# Patient Record
Sex: FEMALE | Employment: FULL TIME | URBAN - METROPOLITAN AREA
[De-identification: names, ages, dates, MRNs, and addresses within clinical notes are randomized per-mention and may not be internally consistent; named-entity substitution may affect disease eponyms.]

---

## 2019-04-01 ENCOUNTER — APPOINTMENT (OUTPATIENT)
Dept: GENERAL RADIOLOGY | Facility: CLINIC | Age: 29
End: 2019-04-01
Attending: EMERGENCY MEDICINE

## 2019-04-01 ENCOUNTER — HOSPITAL ENCOUNTER (EMERGENCY)
Facility: CLINIC | Age: 29
Discharge: HOME | End: 2019-04-01
Attending: EMERGENCY MEDICINE | Admitting: EMERGENCY MEDICINE

## 2019-04-01 VITALS
TEMPERATURE: 101.6 F | HEART RATE: 117 BPM | OXYGEN SATURATION: 95 % | BODY MASS INDEX: 43 KG/M2 | DIASTOLIC BLOOD PRESSURE: 58 MMHG | SYSTOLIC BLOOD PRESSURE: 128 MMHG | WEIGHT: 274 LBS | HEIGHT: 67 IN

## 2019-04-01 DIAGNOSIS — J11.1 INFLUENZA: ICD-10-CM

## 2019-04-01 LAB
ALBUMIN SERPL-MCNC: 3.5 G/DL (ref 3.4–5)
ALP SERPL-CCNC: 82 U/L (ref 40–150)
ALT SERPL W P-5'-P-CCNC: 22 U/L (ref 0–50)
ANION GAP SERPL CALCULATED.3IONS-SCNC: 8 MMOL/L (ref 3–14)
AST SERPL W P-5'-P-CCNC: 14 U/L (ref 0–45)
BASOPHILS # BLD AUTO: 0 10E9/L (ref 0–0.2)
BASOPHILS NFR BLD AUTO: 0.2 %
BILIRUB SERPL-MCNC: 0.3 MG/DL (ref 0.2–1.3)
BUN SERPL-MCNC: 6 MG/DL (ref 7–30)
CALCIUM SERPL-MCNC: 8.4 MG/DL (ref 8.5–10.1)
CHLORIDE SERPL-SCNC: 108 MMOL/L (ref 94–109)
CO2 SERPL-SCNC: 23 MMOL/L (ref 20–32)
CREAT SERPL-MCNC: 0.68 MG/DL (ref 0.52–1.04)
DIFFERENTIAL METHOD BLD: NORMAL
EOSINOPHIL # BLD AUTO: 0.1 10E9/L (ref 0–0.7)
EOSINOPHIL NFR BLD AUTO: 0.6 %
ERYTHROCYTE [DISTWIDTH] IN BLOOD BY AUTOMATED COUNT: 13.6 % (ref 10–15)
FLUAV+FLUBV AG SPEC QL: NEGATIVE
FLUAV+FLUBV AG SPEC QL: POSITIVE
GFR SERPL CREATININE-BSD FRML MDRD: >90 ML/MIN/{1.73_M2}
GLUCOSE SERPL-MCNC: 108 MG/DL (ref 70–99)
HCT VFR BLD AUTO: 37.9 % (ref 35–47)
HGB BLD-MCNC: 13 G/DL (ref 11.7–15.7)
IMM GRANULOCYTES # BLD: 0 10E9/L (ref 0–0.4)
IMM GRANULOCYTES NFR BLD: 0.5 %
LACTATE BLD-SCNC: 1.4 MMOL/L (ref 0.7–2)
LYMPHOCYTES # BLD AUTO: 1 10E9/L (ref 0.8–5.3)
LYMPHOCYTES NFR BLD AUTO: 11.6 %
MCH RBC QN AUTO: 30.7 PG (ref 26.5–33)
MCHC RBC AUTO-ENTMCNC: 34.3 G/DL (ref 31.5–36.5)
MCV RBC AUTO: 89 FL (ref 78–100)
MONOCYTES # BLD AUTO: 0.4 10E9/L (ref 0–1.3)
MONOCYTES NFR BLD AUTO: 4.4 %
NEUTROPHILS # BLD AUTO: 7.3 10E9/L (ref 1.6–8.3)
NEUTROPHILS NFR BLD AUTO: 82.7 %
NRBC # BLD AUTO: 0 10*3/UL
NRBC BLD AUTO-RTO: 0 /100
PLATELET # BLD AUTO: 362 10E9/L (ref 150–450)
POTASSIUM SERPL-SCNC: 3.8 MMOL/L (ref 3.4–5.3)
PROT SERPL-MCNC: 7.5 G/DL (ref 6.8–8.8)
RBC # BLD AUTO: 4.24 10E12/L (ref 3.8–5.2)
SODIUM SERPL-SCNC: 139 MMOL/L (ref 133–144)
SPECIMEN SOURCE: ABNORMAL
WBC # BLD AUTO: 8.8 10E9/L (ref 4–11)

## 2019-04-01 PROCEDURE — 87804 INFLUENZA ASSAY W/OPTIC: CPT | Performed by: EMERGENCY MEDICINE

## 2019-04-01 PROCEDURE — 25000128 H RX IP 250 OP 636: Performed by: EMERGENCY MEDICINE

## 2019-04-01 PROCEDURE — 25000132 ZZH RX MED GY IP 250 OP 250 PS 637: Performed by: EMERGENCY MEDICINE

## 2019-04-01 PROCEDURE — 80053 COMPREHEN METABOLIC PANEL: CPT | Performed by: EMERGENCY MEDICINE

## 2019-04-01 PROCEDURE — 85025 COMPLETE CBC W/AUTO DIFF WBC: CPT | Performed by: EMERGENCY MEDICINE

## 2019-04-01 PROCEDURE — 25000131 ZZH RX MED GY IP 250 OP 636 PS 637: Performed by: EMERGENCY MEDICINE

## 2019-04-01 PROCEDURE — 99284 EMERGENCY DEPT VISIT MOD MDM: CPT | Mod: 25

## 2019-04-01 PROCEDURE — 83605 ASSAY OF LACTIC ACID: CPT | Performed by: EMERGENCY MEDICINE

## 2019-04-01 PROCEDURE — 96360 HYDRATION IV INFUSION INIT: CPT

## 2019-04-01 PROCEDURE — 71046 X-RAY EXAM CHEST 2 VIEWS: CPT

## 2019-04-01 RX ORDER — IBUPROFEN 600 MG/1
600 TABLET, FILM COATED ORAL ONCE
Status: COMPLETED | OUTPATIENT
Start: 2019-04-01 | End: 2019-04-01

## 2019-04-01 RX ORDER — ACETAMINOPHEN 500 MG
1000 TABLET ORAL ONCE
Status: COMPLETED | OUTPATIENT
Start: 2019-04-01 | End: 2019-04-01

## 2019-04-01 RX ORDER — OSELTAMIVIR PHOSPHATE 75 MG/1
75 CAPSULE ORAL 2 TIMES DAILY
Qty: 10 CAPSULE | Refills: 0 | Status: SHIPPED | OUTPATIENT
Start: 2019-04-01 | End: 2019-04-06

## 2019-04-01 RX ORDER — ONDANSETRON 4 MG/1
4 TABLET, ORALLY DISINTEGRATING ORAL EVERY 8 HOURS PRN
Qty: 10 TABLET | Refills: 0 | Status: SHIPPED | OUTPATIENT
Start: 2019-04-01 | End: 2019-04-04

## 2019-04-01 RX ORDER — ONDANSETRON 4 MG/1
4 TABLET, ORALLY DISINTEGRATING ORAL ONCE
Status: COMPLETED | OUTPATIENT
Start: 2019-04-01 | End: 2019-04-01

## 2019-04-01 RX ADMIN — SODIUM CHLORIDE 1000 ML: 9 INJECTION, SOLUTION INTRAVENOUS at 05:13

## 2019-04-01 RX ADMIN — ACETAMINOPHEN 1000 MG: 500 TABLET, FILM COATED ORAL at 06:20

## 2019-04-01 RX ADMIN — IBUPROFEN 600 MG: 600 TABLET ORAL at 05:13

## 2019-04-01 RX ADMIN — ONDANSETRON 4 MG: 4 TABLET, ORALLY DISINTEGRATING ORAL at 05:07

## 2019-04-01 ASSESSMENT — MIFFLIN-ST. JEOR: SCORE: 1995.49

## 2019-04-01 ASSESSMENT — ENCOUNTER SYMPTOMS
COUGH: 1
NAUSEA: 1
MYALGIAS: 1

## 2019-04-01 NOTE — ED AVS SNAPSHOT
Emergency Department  [address] [place] [address]  Phone:  [phone]  Fax:  [phone]                                    Lorena Ivan   MRN: [id number]    Department:   Emergency Department   Date of Visit:  4/1/2019           After Visit Summary Signature Page    I have received my discharge instructions, and my questions have been answered. I have discussed any challenges I see with this plan with the nurse or doctor.    ..........................................................................................................................................  Patient/Patient Representative Signature      ..........................................................................................................................................  Patient Representative Print Name and Relationship to Patient    ..................................................               ................................................  Date                                   Time    ..........................................................................................................................................  Reviewed by Signature/Title    ...................................................              ..............................................  Date                                               Time          22EPIC Rev 08/18

## 2019-04-01 NOTE — LETTER
EMERGENCY DEPARTMENT  [address] [facility] [address]  [phone]      2019    Lorenanitin Ivan  [address] [facility] [address]  [phone] (home)     : 1990      To Whom it may concern:    Lorena Ivan was seen in our Emergency Department today, 2019     For the next 2 days she should not work            Sincerely,    Dr Seaman

## 2019-04-01 NOTE — ED PROVIDER NOTES
"  History     Chief Complaint:  Generalized Body Aches       HPI   Lorena Ivan is a 28 year old female who presents with generalized myalgias. The patient reports that she had a mild cough yesterday but for the most part felt well. This morning, the patient woke up from sleep at 2 AM with myalgias and felt like she had a fever but did not record a temperature. Patient also reports feeling a burning sensation in her chest. Her cough has been non productive but has sounded wet to her. She feels nauseous right now but denies urinary symptoms. She does not think she could be pregnant. Of note, the patient's  has had influenza for the past 2 days and she did not get a flu shot this year. Patient has not taken anything for her pain.     Allergies:  No known drug allergies     Medications:    The patient is not currently taking any prescribed medications.    Past Medical History:    HPV in female   ITP (idiopathic thrombocytopaenic purpura)   Umbilical hernia     Past Surgical History:    BONE MARROW BIOPSY, BONE SPECIMEN, NEEDLE/TROCAR   BRONCHOSCOPY FLEXIBLE AND RIGID   HERNIORRHAPHY VENTRAL   LAPAROSCOPIC SPLENECTOMY     Family History:    Uterine cancer  Cervical myelopathy    Social History:  Smoking status: Never smoker  Alcohol use: Yes  Marital Status:   [2]       Review of Systems   Respiratory: Positive for cough.    Cardiovascular: Positive for chest pain.   Gastrointestinal: Positive for nausea.   Genitourinary: Negative.    Musculoskeletal: Positive for myalgias.   All other systems reviewed and are negative.        Physical Exam     Patient Vitals for the past 24 hrs:   BP Temp Temp src Pulse Heart Rate SpO2 Height Weight   04/01/19 0618 -- 101.6  F (38.7  C) Oral -- -- -- -- --   04/01/19 0617 128/58 -- -- 117 -- 95 % -- --   04/01/19 0436 134/70 102.2  F (39  C) Oral -- 113 95 % 1.702 m (5' 7\") 124.3 kg (274 lb)         Physical Exam  Constitutional:  Looks uncomfortable.   HENT:   Head: "    Atraumatic.   Mouth/Throat:   Oropharynx is without erythema or exudate and mucous     membranes are moist.   Eyes:    Conjunctivae normal and EOM are normal.      Pupils are equal, round, and reactive to light.   Neck:    Normal range of motion. Neck supple.   Cardiovascular:   Tachycardic, no murmurs, rubs, gallops, regular rhythm, normal heart sounds and radial and    dorsalis pedis pulses are 2+ and symmetric.    Pulmonary/Chest:  Effort normal and breath sounds normal.   Abdominal:   Soft. Bowel sounds are normal.      No splenomegaly or hepatomegaly. No tenderness. No rebound.   Musculoskeletal:  Normal range of motion. No edema and no tenderness.   Neurological:  Alert. Normal strength. No cranial nerve deficit.  Skin:    Skin is warm and dry.   Psychiatric:   Normal mood and affect.     Emergency Department Course     Imaging:  Radiographic findings were communicated with the patient who voiced understanding of the findings.    Chest XR  IMPRESSION: No infiltrates or other acute findings. Heart size is  within normal limits  As read by radiology     Laboratory:  Influenza A/B antigen: Influenza A positive, Influenza B negative   0513: Lactic acid 1.4  CMP: Glucose 108, Urea nitrogen 108, calcium 8.4, (Creatinine 0.68)  CBC: WNL (WBC 8.8, HGB 13.0, )    Interventions:  0620: Tylenol 1000 mg PO  0513: Ibuprofen 600 mg PO   0507: Zofran-ODT 4 mg PO  0617: NaCl bolus 1000 ml IV    Emergency Department Course:  Past medical records, nursing notes, and vitals reviewed.  0456: I performed an exam of the patient and obtained history, as documented above.    IV inserted and blood drawn.    The patient was sent for a Chest XR while in the emergency department, findings above.    0652: I rechecked the patient.  Findings and plan explained to the Patient. Patient discharged home with instructions regarding supportive care, medications, and reasons to return. The importance of close follow-up was reviewed.        Impression & Plan      Medical Decision Making:  This patient presents for evaluation of cough and myalgias. This presentation and exam are consistent with influenza. The patient has a normal oxygen saturation and does not have increased work of breathing.  She describes burning chest pain, and has a history of past severe pneumonia and is asplenic. CXR does not show pneumonia or other acute abnormality. The patient is  within the treatment window for influenza. After discussing the benefits and side effects, at the patient's request, Tamiflu was prescribed.  The patient understands that they are at risk for developing pneumonia. They understand to return to the ED with new or worse symptoms otherwise follow up in clinic if the fever is not improving in 2 days. At this time there is no sign of serious bacterial infection such as bacteremia, meningitis, UTI/pyelonephritis, strep pharyngitis, etc.        Diagnosis:    ICD-10-CM    1. Influenza J11.1        Disposition:  discharged to home    Discharge Medications:     Medication List      Started    ondansetron 4 MG ODT tab  Commonly known as:  ZOFRAN ODT  4 mg, Oral, EVERY 8 HOURS PRN     oseltamivir 75 MG capsule  Commonly known as:  TAMIFLU  75 mg, Oral, 2 TIMES DAILY              Jean Pierre Bryson  4/1/2019    EMERGENCY DEPARTMENT    Scribe Disclosure:  IJean Pierre, am serving as a scribe at 4:56 AM on 4/1/2019 to document services personally performed by Rony Broderick MD based on my observations and the provider's statements to me.          Rony Broderick MD  04/05/19 0413

## 2019-08-18 ENCOUNTER — HOSPITAL ENCOUNTER (EMERGENCY)
Facility: CLINIC | Age: 29
Discharge: HOME | End: 2019-08-18
Attending: EMERGENCY MEDICINE | Admitting: EMERGENCY MEDICINE
Payer: COMMERCIAL

## 2019-08-18 VITALS
HEART RATE: 62 BPM | HEIGHT: 67 IN | TEMPERATURE: 98.4 F | SYSTOLIC BLOOD PRESSURE: 132 MMHG | WEIGHT: 235 LBS | OXYGEN SATURATION: 99 % | RESPIRATION RATE: 18 BRPM | DIASTOLIC BLOOD PRESSURE: 74 MMHG | BODY MASS INDEX: 36.88 KG/M2
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
## 2019-08-18 PROCEDURE — 99283 EMERGENCY DEPT VISIT LOW MDM: CPT

## 2019-08-18 PROCEDURE — 25000132 ZZH RX MED GY IP 250 OP 250 PS 637: Performed by: EMERGENCY MEDICINE

## 2019-08-18 RX ADMIN — AMOXICILLIN AND CLAVULANATE POTASSIUM 1 TABLET: 875; 125 TABLET, FILM COATED ORAL at 22:45

## 2019-08-18 ASSESSMENT — MIFFLIN-ST. JEOR: SCORE: 1818.58

## 2019-08-18 ASSESSMENT — ENCOUNTER SYMPTOMS: WOUND: 0

## 2019-08-18 NOTE — ED AVS SNAPSHOT
Emergency Department  [address] [place] [address]  Phone:  [phone]  Fax:  [phone]                                    Lorena Ivan   MRN: [id number]    Department:   Emergency Department   Date of Visit:  8/18/2019           After Visit Summary Signature Page    I have received my discharge instructions, and my questions have been answered. I have discussed any challenges I see with this plan with the nurse or doctor.    ..........................................................................................................................................  Patient/Patient Representative Signature      ..........................................................................................................................................  Patient Representative Print Name and Relationship to Patient    ..................................................               ................................................  Date                                   Time    ..........................................................................................................................................  Reviewed by Signature/Title    ...................................................              ..............................................  Date                                               Time          22EPIC Rev 08/18

## 2019-08-19 NOTE — DISCHARGE INSTRUCTIONS
Wash wound at least once daily and pat dry with clean towel.    Please take an over-the-counter probiotic while you are taking Augmentin.    Please return to the emergency department as needed for new or worsening symptoms including fever greater than 100.4  F, redness or swelling to your finger, vomiting and unable to keep anything down, any other concerning symptoms.

## 2019-08-19 NOTE — ED TRIAGE NOTES
Pt has hx of splenectomy.  Tonight has a very small bite from dog and MD recommends her to be seen for treatment.

## 2019-08-19 NOTE — ED PROVIDER NOTES
"  History     Chief Complaint:  Dog Bite      HPI   Lorena Ivan is a right-handed 28 year old female who presents with a dog bite to her right index finger. Patient notes this was her dog and occurred at home just prior to arrival. Her dog's immunizations are not up to date, however he is rarely with other dogs. He is not acting rabid. It was recommended because of her splenectomy that she come be evaluated in the ED.     Allergies:  No known drug allergies.    Medications:    Tylenol      Past Medical History:    Gestational HTN  HPV in female  ITP  Umbilical hernia     Past Surgical History:    Bone marrow biopsy, bone specimen, needle/trocar  Bronchoscopy flexible and rigid   Herniorrhaphy ventral   Laparoscopic splenectomy     Family History:    Wegener's  Uterine cancer    Social History:  Presents to the ED by herself.   Tobacco Use: Never  Alcohol Use: About 2-4 drinks/month  PCP: Physician No Ref-Primary  Marital Status:   [2]    Review of Systems   Skin: Negative for wound.       Physical Exam   First Vitals:  BP: (!) 144/68  Pulse: 62  Heart Rate: 62  Temp: 98.4  F (36.9  C)  Resp: 16  Height: 170.2 cm (5' 7\")  Weight: 106.6 kg (235 lb)  SpO2: 98 %      Physical Exam  Constitutional: Well developed, nontox appearance  Head: Atraumatic.   Mouth/Throat: Oropharynx is clear and moist.   Neck:  no stridor  Eyes: no scleral icterus  Cardiovascular: RRR, 2+ right radial pulses  Pulmonary/Chest: nml resp effort  Ext: Warm, well perfused   RUE: Right index finger with small dog bite/abrasion, hemostatic, full range of motion of right hand fingers, sensation grossly intact  Neurological: A&O, symmetric facies, moves ext x4  Skin: Skin is warm and dry.   Psychiatric: Behavior is normal. Thought content normal.   Nursing note and vitals reviewed.        Emergency Department Course     Interventions:  Given: Augmentin, 1 tablet, oral    Emergency Department Course:  The patient arrived in triage where " vitals were measured and recorded.   The patient was then escorted back to the emergency department.   The patient's medical records were reviewed.  Nursing notes and vitals were reviewed.  2205: I performed an exam of the patient as documented above.  The above workup was undertaken.  Findings and plan explained to the Patient. Patient discharged home, status improved, with instructions regarding supportive care, medications, and reasons to return as well as the importance of close follow-up was reviewed. Patient was prescribed Augmentin.     Impression & Plan      Medical Decision Making:  Lorena Ivan is a 28 year old female who presents for evaluation of a dog bite to her right index finger.  The workup here in the ED shows no signs of compartment syndrome, significant lacerations, tendon or bone injury.  No signs of foreign body or dog teeth in wound.  Dog is known so will have them observe for signs of rabies; no rabies shots indicated from ED. The patient adequately wash her wound prior to arrival.  Given her history of asplenia, patient started on infection prophylaxis with Augmentin, first dose given in the emergency department.  At this time I feel the patient is safe for discharge.  Recommendations given regarding follow up with PCP and return to the emergency department as needed for new or worsening symptoms.  Pt counseled on diagnosis and disposition.  They are understanding and agreeable to plan. Patient discharged in stable condition.          Diagnosis:    ICD-10-CM    1. Dog bite, initial encounter W54.0XXA        Disposition:  Discharged to home.       Ginny HOLLOWAY, am serving as a scribe on 8/18/2019 at 10:05 PM to personally document services performed by Dr. Yuan based on my observations and the provider's statements to me.    EMERGENCY DEPARTMENT       Trenton Yuan MD  08/20/19 0038

## 2020-09-14 ENCOUNTER — OFFICE VISIT (OUTPATIENT)
Dept: URGENT CARE | Facility: URGENT CARE | Age: 30
End: 2020-09-14
Payer: COMMERCIAL

## 2020-09-14 VITALS
DIASTOLIC BLOOD PRESSURE: 70 MMHG | SYSTOLIC BLOOD PRESSURE: 122 MMHG | RESPIRATION RATE: 16 BRPM | HEART RATE: 77 BPM | TEMPERATURE: 98.9 F | OXYGEN SATURATION: 98 %

## 2020-09-14 DIAGNOSIS — R30.0 DYSURIA: Primary | ICD-10-CM

## 2020-09-14 DIAGNOSIS — N39.0 URINARY TRACT INFECTION WITHOUT HEMATURIA, SITE UNSPECIFIED: ICD-10-CM

## 2020-09-14 LAB
BACTERIA #/AREA URNS HPF: ABNORMAL /HPF
NON-SQ EPI CELLS #/AREA URNS LPF: ABNORMAL /LPF
RBC #/AREA URNS AUTO: ABNORMAL /HPF
URNS CMNT MICRO: ABNORMAL
WBC #/AREA URNS AUTO: ABNORMAL /HPF

## 2020-09-14 PROCEDURE — 81015 MICROSCOPIC EXAM OF URINE: CPT | Performed by: PHYSICIAN ASSISTANT

## 2020-09-14 PROCEDURE — 99203 OFFICE O/P NEW LOW 30 MIN: CPT | Performed by: PHYSICIAN ASSISTANT

## 2020-09-14 RX ORDER — NITROFURANTOIN 25; 75 MG/1; MG/1
100 CAPSULE ORAL 2 TIMES DAILY
Qty: 14 CAPSULE | Refills: 0 | Status: SHIPPED | OUTPATIENT
Start: 2020-09-14

## 2020-09-14 NOTE — PROGRESS NOTES
SUBJECTIVE:   Lorena Ivan is a 30 year old female who  presents today for a possible UTI. Symptoms of dysuria, urgency and frequency have been going on for 1hour(s).  Hematuria no.  sudden onsetand mild.  There is no history of fever, chills, nausea or vomiting.   This patient does  have a history of urinary tract infections. Patient denies long duration, rigors, flank pain, temperature > 101 degrees F. and Vomiting, significant nausea or diarrhea or vaginal discharge     Past Medical History:   Diagnosis Date     HPV in female      ITP (idiopathic thrombocytopaenic purpura)      Umbilical hernia      ALLERGIES  No Known Allergies     Family History   Problem Relation Age of Onset     Autoimmune Disease Sister         Wegener's     Other - See Comments Father         Cervical myelopathy?     LUNG DISEASE Paternal Aunt         Sarcoidosis     Gastrointestinal Disease Paternal Uncle         Reported autoimmune GI disease not otherwise specified     Cancer Mother         Uterine cancer       Social History     Tobacco Use     Smoking status: Never Smoker     Smokeless tobacco: Never Used   Substance Use Topics     Alcohol use: Yes     Alcohol/week: 0.0 standard drinks     Comment: occasional        ROS:   CONSTITUTIONAL:NEGATIVE for fever, chills, change in weight  INTEGUMENTARY/SKIN: NEGATIVE for worrisome rashes, moles or lesions  RESP:NEGATIVE for significant cough or SOB  CV: NEGATIVE for chest pain, palpitations or peripheral edema  GI: NEGATIVE for nausea, abdominal pain, heartburn, or change in bowel habits  : dysuria and frequency   MUSCULOSKELETAL: NEGATIVE for significant arthralgias or myalgia  NEURO: NEGATIVE for weakness, dizziness or paresthesias    OBJECTIVE:  /70   Pulse 77   Temp 98.9  F (37.2  C) (Temporal)   Resp 16   SpO2 98%   GENERAL APPEARANCE: healthy, alert and no distress  RESP: lungs clear to auscultation - no rales, rhonchi or wheezes  CV: regular rates and rhythm, normal  S1 S2, no murmur noted  ABDOMEN:  soft, nontender, no HSM or masses and bowel sounds normal  BACK: No CVA tenderness  SKIN: no suspicious lesions or rashes    Results for orders placed or performed in visit on 09/14/20   Urine Microscopic     Status: Abnormal   Result Value Ref Range    WBC Urine 5-10 (A) OTO5^0 - 5 /HPF    RBC Urine 5-10 (A) OTO2^O - 2 /HPF    Squamous Epithelial /LPF Urine Moderate (A) FEW^Few /LPF    Bacteria Urine Few (A) NEG^Negative /HPF    Comment Urine Interfering substances, dipstick not done        ASSESSMENT/PLAN      ICD-10-CM    1. Dysuria  R30.0 Urine Microscopic     nitroFURantoin macrocrystal-monohydrate (MACROBID) 100 MG capsule     CANCELED: UA with Microscopic reflex to Culture   2. Urinary tract infection without hematuria, site unspecified  N39.0 nitroFURantoin macrocrystal-monohydrate (MACROBID) 100 MG capsule       Lower, uncomplicated urinary tract infection.    PLAN:  Orders Placed This Encounter     Urine Microscopic     nitroFURantoin macrocrystal-monohydrate (MACROBID) 100 MG capsule       Urine culture pending  Drink plenty of fluids.  Prevention and treatment of UTI's discussed.Signs and symptoms of pyelonephritis mentioned.  Follow up with primary care physician if not improving

## 2023-08-07 ENCOUNTER — LAB REQUISITION (OUTPATIENT)
Dept: LAB | Facility: CLINIC | Age: 33
End: 2023-08-07
Payer: COMMERCIAL

## 2023-08-07 DIAGNOSIS — Z01.419 ENCOUNTER FOR GYNECOLOGICAL EXAMINATION (GENERAL) (ROUTINE) WITHOUT ABNORMAL FINDINGS: ICD-10-CM

## 2023-08-07 PROCEDURE — 87624 HPV HI-RISK TYP POOLED RSLT: CPT | Mod: ORL | Performed by: OBSTETRICS & GYNECOLOGY

## 2023-08-07 PROCEDURE — G0145 SCR C/V CYTO,THINLAYER,RESCR: HCPCS | Mod: ORL | Performed by: OBSTETRICS & GYNECOLOGY

## 2023-08-09 LAB
BKR LAB AP GYN ADEQUACY: NORMAL
BKR LAB AP GYN INTERPRETATION: NORMAL
BKR LAB AP HPV REFLEX: NORMAL
BKR LAB AP LMP: NORMAL
BKR LAB AP PREVIOUS ABNL DX: NORMAL
BKR LAB AP PREVIOUS ABNORMAL: NORMAL
PATH REPORT.COMMENTS IMP SPEC: NORMAL
PATH REPORT.COMMENTS IMP SPEC: NORMAL
PATH REPORT.RELEVANT HX SPEC: NORMAL

## 2023-08-10 LAB
HUMAN PAPILLOMA VIRUS 16 DNA: NEGATIVE
HUMAN PAPILLOMA VIRUS 18 DNA: NEGATIVE
HUMAN PAPILLOMA VIRUS FINAL DIAGNOSIS: NORMAL
HUMAN PAPILLOMA VIRUS OTHER HR: NEGATIVE